# Patient Record
Sex: MALE | Race: WHITE | NOT HISPANIC OR LATINO | Employment: OTHER | URBAN - NONMETROPOLITAN AREA
[De-identification: names, ages, dates, MRNs, and addresses within clinical notes are randomized per-mention and may not be internally consistent; named-entity substitution may affect disease eponyms.]

---

## 2023-09-24 PROBLEM — G47.30 SLEEP APNEA: Status: ACTIVE | Noted: 2023-09-24

## 2023-09-24 PROBLEM — C61 PROSTATE CANCER (MULTI): Status: ACTIVE | Noted: 2023-09-24

## 2023-09-24 PROBLEM — E66.811 CLASS 1 OBESITY WITH BODY MASS INDEX (BMI) OF 31.0 TO 31.9 IN ADULT: Status: ACTIVE | Noted: 2023-09-24

## 2023-09-24 PROBLEM — I10 BENIGN ESSENTIAL HYPERTENSION: Status: ACTIVE | Noted: 2023-09-24

## 2023-09-24 PROBLEM — I71.21 ASCENDING AORTIC ANEURYSM (CMS-HCC): Status: ACTIVE | Noted: 2023-09-24

## 2023-09-24 PROBLEM — E66.9 CLASS 1 OBESITY WITH BODY MASS INDEX (BMI) OF 31.0 TO 31.9 IN ADULT: Status: ACTIVE | Noted: 2023-09-24

## 2023-09-24 PROBLEM — I34.0 MODERATE MITRAL REGURGITATION: Status: ACTIVE | Noted: 2023-09-24

## 2023-09-24 PROBLEM — R93.1 HIGH CORONARY ARTERY CALCIUM SCORE: Status: ACTIVE | Noted: 2023-09-24

## 2023-09-24 PROBLEM — I48.21 PERMANENT ATRIAL FIBRILLATION WITH RVR (MULTI): Status: ACTIVE | Noted: 2023-09-24

## 2023-09-24 PROBLEM — E78.5 HYPERLIPIDEMIA: Status: ACTIVE | Noted: 2023-09-24

## 2023-09-24 PROBLEM — I35.1 MILD AORTIC REGURGITATION: Status: ACTIVE | Noted: 2023-09-24

## 2023-09-24 RX ORDER — ALLOPURINOL 300 MG/1
1 TABLET ORAL DAILY
COMMUNITY

## 2023-09-24 RX ORDER — ROSUVASTATIN CALCIUM 20 MG/1
1 TABLET, COATED ORAL DAILY
COMMUNITY
End: 2024-03-07 | Stop reason: SDUPTHER

## 2023-09-24 RX ORDER — ASPIRIN 81 MG/1
81 TABLET ORAL 2 TIMES WEEKLY
COMMUNITY

## 2023-09-24 RX ORDER — DILTIAZEM HYDROCHLORIDE 180 MG/1
1 CAPSULE, EXTENDED RELEASE ORAL DAILY
COMMUNITY
End: 2023-11-02 | Stop reason: SDUPTHER

## 2023-09-24 RX ORDER — METOPROLOL SUCCINATE 25 MG/1
1 TABLET, EXTENDED RELEASE ORAL DAILY
COMMUNITY
End: 2024-05-16 | Stop reason: SDUPTHER

## 2023-09-24 RX ORDER — LEVOTHYROXINE SODIUM 137 UG/1
1 TABLET ORAL DAILY
COMMUNITY

## 2023-09-24 RX ORDER — LISINOPRIL 20 MG/1
1 TABLET ORAL DAILY
COMMUNITY

## 2023-10-23 ENCOUNTER — HOSPITAL ENCOUNTER (OUTPATIENT)
Dept: CARDIOLOGY | Facility: CLINIC | Age: 78
Discharge: HOME | End: 2023-10-23
Payer: MEDICARE

## 2023-10-23 VITALS
BODY MASS INDEX: 31.5 KG/M2 | HEIGHT: 70 IN | WEIGHT: 220 LBS | DIASTOLIC BLOOD PRESSURE: 70 MMHG | SYSTOLIC BLOOD PRESSURE: 118 MMHG

## 2023-10-23 DIAGNOSIS — I34.0 NONRHEUMATIC MITRAL (VALVE) INSUFFICIENCY: ICD-10-CM

## 2023-10-23 DIAGNOSIS — I10 ESSENTIAL (PRIMARY) HYPERTENSION: ICD-10-CM

## 2023-10-23 LAB — EJECTION FRACTION APICAL 4 CHAMBER: 53.4

## 2023-10-23 PROCEDURE — 93306 TTE W/DOPPLER COMPLETE: CPT | Performed by: INTERNAL MEDICINE

## 2023-10-23 PROCEDURE — 93306 TTE W/DOPPLER COMPLETE: CPT

## 2023-11-02 DIAGNOSIS — I10 BENIGN ESSENTIAL HYPERTENSION: ICD-10-CM

## 2023-11-02 RX ORDER — DILTIAZEM HYDROCHLORIDE 180 MG/1
180 CAPSULE, EXTENDED RELEASE ORAL DAILY
Qty: 90 CAPSULE | Refills: 3 | Status: SHIPPED | OUTPATIENT
Start: 2023-11-02

## 2023-11-03 ENCOUNTER — OFFICE VISIT (OUTPATIENT)
Dept: CARDIOLOGY | Facility: CLINIC | Age: 78
End: 2023-11-03
Payer: MEDICARE

## 2023-11-03 VITALS
BODY MASS INDEX: 31.98 KG/M2 | WEIGHT: 223.36 LBS | HEIGHT: 70 IN | DIASTOLIC BLOOD PRESSURE: 70 MMHG | HEART RATE: 71 BPM | SYSTOLIC BLOOD PRESSURE: 118 MMHG

## 2023-11-03 DIAGNOSIS — I71.21 ASCENDING AORTIC ANEURYSM, UNSPECIFIED WHETHER RUPTURED (CMS-HCC): ICD-10-CM

## 2023-11-03 DIAGNOSIS — I10 BENIGN ESSENTIAL HYPERTENSION: ICD-10-CM

## 2023-11-03 DIAGNOSIS — G47.30 SLEEP APNEA, UNSPECIFIED TYPE: ICD-10-CM

## 2023-11-03 DIAGNOSIS — I48.21 PERMANENT ATRIAL FIBRILLATION WITH RVR (MULTI): ICD-10-CM

## 2023-11-03 DIAGNOSIS — E78.5 HYPERLIPIDEMIA, UNSPECIFIED HYPERLIPIDEMIA TYPE: ICD-10-CM

## 2023-11-03 DIAGNOSIS — I35.1 MILD AORTIC REGURGITATION: ICD-10-CM

## 2023-11-03 DIAGNOSIS — I34.0 MODERATE MITRAL REGURGITATION: ICD-10-CM

## 2023-11-03 DIAGNOSIS — R93.1 HIGH CORONARY ARTERY CALCIUM SCORE: ICD-10-CM

## 2023-11-03 PROCEDURE — 1159F MED LIST DOCD IN RCRD: CPT | Performed by: INTERNAL MEDICINE

## 2023-11-03 PROCEDURE — 99214 OFFICE O/P EST MOD 30 MIN: CPT | Performed by: INTERNAL MEDICINE

## 2023-11-03 PROCEDURE — 3078F DIAST BP <80 MM HG: CPT | Performed by: INTERNAL MEDICINE

## 2023-11-03 PROCEDURE — 3074F SYST BP LT 130 MM HG: CPT | Performed by: INTERNAL MEDICINE

## 2023-11-03 PROCEDURE — 1036F TOBACCO NON-USER: CPT | Performed by: INTERNAL MEDICINE

## 2023-11-03 NOTE — PROGRESS NOTES
Subjective   Hardik Gama Jr. is a 78 y.o. male       Chief Complaint    Follow-up; Results          HPI   Patient is in the office for follow-up for the problems noted below.  He continues to be very active rides his bike 25 miles frequently he denies any palpitations or dyspnea.  Echocardiogram we did recently revealed normal ejection fraction with mild mitral and aortic regurgitation.  Review of system essentially normal physical examination was remarkable for irregular rhythm consistent with chronic atrial fibrillation and mild obesity.    Assessment/recommendations:     1-permanent atrial fibrillation on anticoagulation with Xarelto and rate control with Cardizem and metoprolol. This regimen has been well-tolerated without any problems and will be kept as is. Patient has left atrial dilatation based on his echoes  2-hypertension on medical therapy under control to be monitored lab data were ordered  3-hyperlipidemia on rosuvastatin, lipid profile has been monitored and under control  4-high-risk medication with anticoagulation, no bleeding complications  5-prostate cancer status post seeds implant, stable  6-class I obesity, encouraged patient to lose weight  7-severely elevated coronary calcium score with normal nuclear stress test 2021. Patient is currently on high intensity statin, advised patient take baby aspirin twice weekly. Patient has very active lifestyle.  8-mild mitral regurgitation.  Based on echocardiogram 2023.  Asymptomatic with no murmurs heard, recent echo findings were shared with the patient.  Next echocardiogram will be in 3 years  9-mild aortic regurgitation, inconsequential  10-mild dilatation of the ascending aorta just under 4 cm. We will follow by echocardiogram.  11-patient's father had coronary bypass surgery at later age. He lived to be 92 years old  12-sleep apnea but intolerant to CPAP machine  13-history of gout in remission on allopurinol with no recent flareups  Review of  "Systems   All other systems reviewed and are negative.         Visit Vitals  /70 (BP Location: Left arm, Patient Position: Sitting)   Pulse 71   Ht 1.778 m (5' 10\")   Wt 101 kg (223 lb 5.8 oz)   BMI 32.05 kg/m²   Smoking Status Never   BSA 2.23 m²        Objective   Physical Exam  Constitutional:       Appearance: Normal appearance. He is normal weight.   HENT:      Nose: Nose normal.   Neck:      Vascular: No carotid bruit.   Cardiovascular:      Rate and Rhythm: Normal rate. Rhythm irregular.      Pulses: Normal pulses.      Heart sounds: Normal heart sounds.   Pulmonary:      Effort: Pulmonary effort is normal.   Abdominal:      General: Bowel sounds are normal.      Palpations: Abdomen is soft.   Genitourinary:     Rectum: Normal.   Musculoskeletal:         General: Normal range of motion.      Cervical back: Normal range of motion.      Right lower leg: No edema.      Left lower leg: No edema.   Skin:     General: Skin is warm and dry.   Neurological:      General: No focal deficit present.      Mental Status: He is alert.   Psychiatric:         Mood and Affect: Mood normal.         Behavior: Behavior normal.         Thought Content: Thought content normal.         Judgment: Judgment normal.         Current Medications    Current Outpatient Medications:     allopurinol (Zyloprim) 300 mg tablet, Take 1 tablet (300 mg) by mouth once daily., Disp: , Rfl:     aspirin 81 mg EC tablet, Take 1 tablet (81 mg) by mouth 2 times a week., Disp: , Rfl:     dilTIAZem ER (Tiazac) 180 mg 24 hr capsule, Take 1 capsule (180 mg) by mouth once daily., Disp: 90 capsule, Rfl: 3    levothyroxine (Synthroid, Levoxyl) 137 mcg tablet, Take 1 tablet (137 mcg) by mouth once daily., Disp: , Rfl:     lisinopril 20 mg tablet, Take 1 tablet (20 mg) by mouth once daily., Disp: , Rfl:     metoprolol succinate XL (Toprol-XL) 25 mg 24 hr tablet, Take 1 tablet (25 mg) by mouth once daily., Disp: , Rfl:     rivaroxaban (Xarelto) 20 mg tablet, " Take 1 tablet (20 mg) by mouth once daily., Disp: , Rfl:     rosuvastatin (Crestor) 20 mg tablet, Take 1 tablet (20 mg) by mouth once daily., Disp: , Rfl:                      Assessment/Plan   1. Benign essential hypertension        2. Moderate mitral regurgitation        3. Mild aortic regurgitation        4. Permanent atrial fibrillation with RVR (CMS/HCC)        5. Ascending aortic aneurysm, unspecified whether ruptured (CMS/HCC)        6. High coronary artery calcium score        7. Hyperlipidemia, unspecified hyperlipidemia type        8. Sleep apnea, unspecified type

## 2023-12-11 ENCOUNTER — TELEPHONE (OUTPATIENT)
Dept: CARDIOLOGY | Facility: CLINIC | Age: 78
End: 2023-12-11
Payer: MEDICARE

## 2023-12-11 NOTE — TELEPHONE ENCOUNTER
Patient phoned and states that he has a blood shot eye and that it has been like this for about 4 days now. He is not sure what he did but denies any pain. He notes he is taking Xarelto daily and Aspirin 2 days weekly and was inquiring if he is able to hold them until this clears up.  I advised him to follow up with PCP/ optometrist. He verbalized understanding.     TO Cynthia Garcia NP to review in absence of Dr. Josefa Bui MD

## 2023-12-12 DIAGNOSIS — I48.21 PERMANENT ATRIAL FIBRILLATION WITH RVR (MULTI): ICD-10-CM

## 2023-12-12 RX ORDER — RIVAROXABAN 20 MG/1
20 TABLET, FILM COATED ORAL DAILY
Qty: 90 TABLET | Refills: 3 | Status: SHIPPED | OUTPATIENT
Start: 2023-12-12

## 2023-12-12 NOTE — TELEPHONE ENCOUNTER
Patient contacted. Verbalized understanding to plan.  Will keep office updated if eye does not improve with recommendations.

## 2024-03-06 DIAGNOSIS — E78.2 MIXED HYPERLIPIDEMIA: ICD-10-CM

## 2024-03-07 RX ORDER — ROSUVASTATIN CALCIUM 20 MG/1
20 TABLET, COATED ORAL DAILY
Qty: 90 TABLET | Refills: 3 | Status: SHIPPED | OUTPATIENT
Start: 2024-03-07

## 2024-05-14 LAB
NON-UH HIE ANION GAP:SCNC:PT:SER/PLAS:QN:: 12 MEQ/L (ref 6–16)
NON-UH HIE CALCIUM:MCNC:PT:SER/PLAS:QN:: 9.2 MG/DL (ref 8.9–11.1)
NON-UH HIE CARBON DIOXIDE:SCNC:PT:SER/PLAS:QN:: 24 MMOL/L (ref 21–31)
NON-UH HIE CHLORIDE:SCNC:PT:SER/PLAS:QN:: 107 MMOL/L (ref 101–111)
NON-UH HIE CREATININE:MCNC:PT:SER/PLAS:QN:: 1 MG/DL (ref 0.5–1.3)
NON-UH HIE EGFR: 77 ML/MIN/1.73 M2
NON-UH HIE ERYTHROCYTE DISTRIBUTION WIDTH:RATIO:PT:RBC:QN:AUTOMATED COUNT: 14 % (ref 10.9–14.2)
NON-UH HIE ERYTHROCYTE MEAN CORPUSCULAR HEMOGLOBIN CONCENTRATION:MCNC:PT:RB: 34 GM/DL (ref 31.4–36)
NON-UH HIE ERYTHROCYTE MEAN CORPUSCULAR HEMOGLOBIN:ENTMASS:PT:RBC:QN:AUTOMA: 30.2 PG (ref 27–34)
NON-UH HIE ERYTHROCYTE MEAN CORPUSCULAR VOLUME:ENTVOL:PT:RBC:QN:AUTOMATED C: 88.8 FL (ref 80–100)
NON-UH HIE ERYTHROCYTE SIZE:MORPH:PT:BLD:NOM:: NORMAL
NON-UH HIE ERYTHROCYTES:NCNC:PT:BLD:QN:AUTOMATED COUNT: 4.7 E12/L (ref 4.3–5.9)
NON-UH HIE GLUCOSE:MCNC:PT:SER/PLAS:QN:: 113 MG/DL (ref 55–199)
NON-UH HIE HEMATOCRIT:VFR:PT:BLD:QN:AUTOMATED COUNT: 41.6 % (ref 37.7–49)
NON-UH HIE HEMOGLOBIN:MCNC:PT:BLD:QN:: 14.1 GM/DL (ref 13.5–17.5)
NON-UH HIE LEUKOCYTES: 6 E9/L (ref 4–11)
NON-UH HIE PLATELET MEAN VOLUME:ENTVOL:PT:BLD:QN:AUTOMATED COUNT: 7.6 FL (ref 6.4–10.8)
NON-UH HIE PLATELETS:NCNC:PT:BLD:QN:AUTOMATED COUNT: 280 E9/L (ref 150–500)
NON-UH HIE POTASSIUM:SCNC:PT:SER/PLAS:QN:: 4.6 MMOL/L (ref 3.5–5.3)
NON-UH HIE SODIUM:SCNC:PT:SER/PLAS:QN:: 138 MMOL/L (ref 135–145)
NON-UH HIE UREA NITROGEN/CREATININE:MRTO:PT:SER/PLAS:QN:: 24 NO UNITS (ref 10–20)
NON-UH HIE UREA NITROGEN:MCNC:PT:SER/PLAS:QN:: 24 MG/DL (ref 5–21)

## 2024-05-16 DIAGNOSIS — I10 BENIGN ESSENTIAL HYPERTENSION: ICD-10-CM

## 2024-05-17 ENCOUNTER — OFFICE VISIT (OUTPATIENT)
Dept: CARDIOLOGY | Facility: CLINIC | Age: 79
End: 2024-05-17
Payer: MEDICARE

## 2024-05-17 VITALS
WEIGHT: 216 LBS | DIASTOLIC BLOOD PRESSURE: 84 MMHG | HEART RATE: 78 BPM | HEIGHT: 70 IN | SYSTOLIC BLOOD PRESSURE: 120 MMHG | BODY MASS INDEX: 30.92 KG/M2

## 2024-05-17 DIAGNOSIS — I10 BENIGN ESSENTIAL HYPERTENSION: ICD-10-CM

## 2024-05-17 DIAGNOSIS — E78.5 HYPERLIPIDEMIA, UNSPECIFIED HYPERLIPIDEMIA TYPE: ICD-10-CM

## 2024-05-17 DIAGNOSIS — I71.21 ANEURYSM OF ASCENDING AORTA WITHOUT RUPTURE (CMS-HCC): ICD-10-CM

## 2024-05-17 DIAGNOSIS — R93.1 HIGH CORONARY ARTERY CALCIUM SCORE: ICD-10-CM

## 2024-05-17 DIAGNOSIS — I34.0 MILD MITRAL REGURGITATION: Primary | ICD-10-CM

## 2024-05-17 DIAGNOSIS — E66.9 OBESITY (BMI 30.0-34.9): ICD-10-CM

## 2024-05-17 DIAGNOSIS — Z79.899 HIGH RISK MEDICATION USE: ICD-10-CM

## 2024-05-17 DIAGNOSIS — I35.1 MILD AORTIC REGURGITATION: ICD-10-CM

## 2024-05-17 DIAGNOSIS — I48.21 PERMANENT ATRIAL FIBRILLATION WITH RVR (MULTI): ICD-10-CM

## 2024-05-17 DIAGNOSIS — Z87.39 HISTORY OF GOUT: ICD-10-CM

## 2024-05-17 DIAGNOSIS — Z78.9 NEVER SMOKED TOBACCO: ICD-10-CM

## 2024-05-17 PROCEDURE — 3079F DIAST BP 80-89 MM HG: CPT | Performed by: INTERNAL MEDICINE

## 2024-05-17 PROCEDURE — 1036F TOBACCO NON-USER: CPT | Performed by: INTERNAL MEDICINE

## 2024-05-17 PROCEDURE — 99214 OFFICE O/P EST MOD 30 MIN: CPT | Performed by: INTERNAL MEDICINE

## 2024-05-17 PROCEDURE — 1159F MED LIST DOCD IN RCRD: CPT | Performed by: INTERNAL MEDICINE

## 2024-05-17 PROCEDURE — 3074F SYST BP LT 130 MM HG: CPT | Performed by: INTERNAL MEDICINE

## 2024-05-17 NOTE — PATIENT INSTRUCTIONS
Please bring all medicines, vitamins, and herbal supplements with you when you come to the office.    Prescriptions will not be filled unless you are compliant with your follow up appointments or have a follow up appointment scheduled as per instruction of your physician. Refills should be requested at the time of your visit.     BMI was above normal measurement. Current weight: 98 kg (216 lb)  Weight change since last visit (-) denotes wt loss -7.36 lbs   Weight loss needed to achieve BMI 25: 42.1 Lbs  Weight loss needed to achieve BMI 30: 7.4 Lbs  Provided instructions on dietary changes.      6 months with lab work  Same meds

## 2024-05-17 NOTE — LETTER
May 17, 2024     Sharlene Polk MD  [address] [place] [id number]    Patient: Hardik Gama Jr.   YOB: 1945   Date of Visit: 5/17/2024       Dear Dr. Sharlene Polk MD:    Thank you for referring Hardik Gama to me for evaluation. Below are my notes for this consultation.  If you have questions, please do not hesitate to call me. I look forward to following your patient along with you.       Sincerely,     Josefa Bui MD      CC: No Recipients  ______________________________________________________________________________________    Subjective   Hardik Gama Jr. is a 78 y.o. male       Chief Complaint    Follow-up          HPI   Patient is in the office for 6-month follow-up for permanent atrial fibrillation among other problems noted below.  He remains very active riding his bike regularly.  His weight is coming down nicely.  He has no palpitations dyspnea or chest pain.  He had recent lab data which I reviewed with him and there was no concern.  He has been on Xarelto without any bleeding complications.  His examination is only remarkable for irregular rhythm and class I obesity.  No cardiac murmurs were heard.  I reviewed with him the last echocardiogram we did back in September 2023.    Assessment/recommendations:     1-permanent atrial fibrillation on anticoagulation with Xarelto and rate control with Cardizem and metoprolol. This regimen has been well-tolerated without any problems and will be kept as is.   2-hypertension on medical therapy under control on metoprolol and diltiazem to be monitored, renal function is normal.  3-hyperlipidemia on rosuvastatin 20 mg daily, lipid profile has been monitored and under control  4-high-risk medication with anticoagulation, no bleeding complications  5-prostate cancer status post seeds implant, stable  6-class I obesity, patient's weight has been coming down and he plans on more weight loss in the next few months  7-severely elevated  "coronary calcium score with normal nuclear stress test 2021. Patient is currently on high intensity statin, advised patient take baby aspirin twice weekly. Patient has very active lifestyle.  8-mild mitral regurgitation.  Based on echocardiogram 2023.  Asymptomatic with no murmurs heard, last echo findings were shared with the patient.  Next echocardiogram will be in 3 years  9-mild aortic regurgitation, inconsequential  10-mild dilatation of the ascending aorta just under 4 cm. We will follow by echocardiogram.  11-patient's father had coronary bypass surgery at later age. He lived to be 92 years old  12-sleep apnea but intolerant to CPAP machine  13-history of gout in remission on allopurinol with no recent flareups  Review of Systems   All other systems reviewed and are negative.           Vitals:    05/17/24 0858   BP: 120/84   Pulse: 78   Weight: 98 kg (216 lb)   Height: 1.778 m (5' 10\")        Objective   Physical Exam  Constitutional:       Appearance: Normal appearance.   HENT:      Nose: Nose normal.   Neck:      Vascular: No carotid bruit.   Cardiovascular:      Rate and Rhythm: Normal rate. Rhythm irregularly irregular.      Pulses: Normal pulses.      Heart sounds: Normal heart sounds.   Pulmonary:      Effort: Pulmonary effort is normal.   Abdominal:      General: Bowel sounds are normal.      Palpations: Abdomen is soft.   Musculoskeletal:         General: Normal range of motion.      Cervical back: Normal range of motion.      Right lower leg: No edema.      Left lower leg: No edema.   Skin:     General: Skin is warm and dry.   Neurological:      General: No focal deficit present.      Mental Status: He is alert.   Psychiatric:         Mood and Affect: Mood normal.         Behavior: Behavior normal.         Thought Content: Thought content normal.         Judgment: Judgment normal.         Allergies  Patient has no known allergies.     Current Medications    Current Outpatient Medications:   •  " allopurinol (Zyloprim) 300 mg tablet, Take 1 tablet (300 mg) by mouth once daily., Disp: , Rfl:   •  aspirin 81 mg EC tablet, Take 1 tablet (81 mg) by mouth 2 times a week., Disp: , Rfl:   •  dilTIAZem ER (Tiazac) 180 mg 24 hr capsule, Take 1 capsule (180 mg) by mouth once daily., Disp: 90 capsule, Rfl: 3  •  levothyroxine (Synthroid, Levoxyl) 137 mcg tablet, Take 1 tablet (137 mcg) by mouth once daily., Disp: , Rfl:   •  lisinopril 20 mg tablet, Take 1 tablet (20 mg) by mouth once daily., Disp: , Rfl:   •  metoprolol succinate XL (Toprol-XL) 25 mg 24 hr tablet, Take 1 tablet (25 mg) by mouth once daily., Disp: , Rfl:   •  rosuvastatin (Crestor) 20 mg tablet, TAKE 1 TABLET BY MOUTH DAILY, Disp: 90 tablet, Rfl: 3  •  Xarelto 20 mg tablet, TAKE 1 TABLET BY MOUTH DAILY, Disp: 90 tablet, Rfl: 3                     Assessment/Plan   1. Mild mitral regurgitation        2. Permanent atrial fibrillation with RVR (Multi)  Follow Up In Cardiology    Follow Up In Cardiology    Basic Metabolic Panel    CBC      3. Benign essential hypertension  Follow Up In Cardiology    Basic Metabolic Panel      4. Mild aortic regurgitation        5. Hyperlipidemia, unspecified hyperlipidemia type  Alanine Aminotransferase    Lipid Panel    Aspartate Aminotransferase      6. High coronary artery calcium score  Alanine Aminotransferase    Lipid Panel    Aspartate Aminotransferase      7. Aneurysm of ascending aorta without rupture (CMS-HCC)        8. High risk medication use  CBC      9. Obesity (BMI 30.0-34.9)        10. Never smoked tobacco        11. History of gout                 Scribe Attestation  By signing my name below, Susanne PRESTON LPN, Scribe   attest that this documentation has been prepared under the direction and in the presence of Josefa Bui MD.     Provider Attestation - Scribe documentation    All medical record entries made by the Scribe were at my direction and personally dictated by me. I have reviewed the chart  and agree that the record accurately reflects my personal performance of the history, physical exam, discussion and plan.

## 2024-05-17 NOTE — PROGRESS NOTES
Subjective   Hardik Gama Jr. is a 78 y.o. male       Chief Complaint    Follow-up          HPI   Patient is in the office for 6-month follow-up for permanent atrial fibrillation among other problems noted below.  He remains very active riding his bike regularly.  His weight is coming down nicely.  He has no palpitations dyspnea or chest pain.  He had recent lab data which I reviewed with him and there was no concern.  He has been on Xarelto without any bleeding complications.  His examination is only remarkable for irregular rhythm and class I obesity.  No cardiac murmurs were heard.  I reviewed with him the last echocardiogram we did back in September 2023.    Assessment/recommendations:     1-permanent atrial fibrillation on anticoagulation with Xarelto and rate control with Cardizem and metoprolol. This regimen has been well-tolerated without any problems and will be kept as is.   2-hypertension on medical therapy under control on metoprolol and diltiazem to be monitored, renal function is normal.  3-hyperlipidemia on rosuvastatin 20 mg daily, lipid profile has been monitored and under control  4-high-risk medication with anticoagulation, no bleeding complications  5-prostate cancer status post seeds implant, stable  6-class I obesity, patient's weight has been coming down and he plans on more weight loss in the next few months  7-severely elevated coronary calcium score with normal nuclear stress test 2021. Patient is currently on high intensity statin, advised patient take baby aspirin twice weekly. Patient has very active lifestyle.  8-mild mitral regurgitation.  Based on echocardiogram 2023.  Asymptomatic with no murmurs heard, last echo findings were shared with the patient.  Next echocardiogram will be in 3 years  9-mild aortic regurgitation, inconsequential  10-mild dilatation of the ascending aorta just under 4 cm. We will follow by echocardiogram.  11-patient's father had coronary bypass surgery at  "later age. He lived to be 92 years old  12-sleep apnea but intolerant to CPAP machine  13-history of gout in remission on allopurinol with no recent flareups  Review of Systems   All other systems reviewed and are negative.           Vitals:    05/17/24 0858   BP: 120/84   Pulse: 78   Weight: 98 kg (216 lb)   Height: 1.778 m (5' 10\")        Objective   Physical Exam  Constitutional:       Appearance: Normal appearance.   HENT:      Nose: Nose normal.   Neck:      Vascular: No carotid bruit.   Cardiovascular:      Rate and Rhythm: Normal rate. Rhythm irregularly irregular.      Pulses: Normal pulses.      Heart sounds: Normal heart sounds.   Pulmonary:      Effort: Pulmonary effort is normal.   Abdominal:      General: Bowel sounds are normal.      Palpations: Abdomen is soft.   Musculoskeletal:         General: Normal range of motion.      Cervical back: Normal range of motion.      Right lower leg: No edema.      Left lower leg: No edema.   Skin:     General: Skin is warm and dry.   Neurological:      General: No focal deficit present.      Mental Status: He is alert.   Psychiatric:         Mood and Affect: Mood normal.         Behavior: Behavior normal.         Thought Content: Thought content normal.         Judgment: Judgment normal.         Allergies  Patient has no known allergies.     Current Medications    Current Outpatient Medications:     allopurinol (Zyloprim) 300 mg tablet, Take 1 tablet (300 mg) by mouth once daily., Disp: , Rfl:     aspirin 81 mg EC tablet, Take 1 tablet (81 mg) by mouth 2 times a week., Disp: , Rfl:     dilTIAZem ER (Tiazac) 180 mg 24 hr capsule, Take 1 capsule (180 mg) by mouth once daily., Disp: 90 capsule, Rfl: 3    levothyroxine (Synthroid, Levoxyl) 137 mcg tablet, Take 1 tablet (137 mcg) by mouth once daily., Disp: , Rfl:     lisinopril 20 mg tablet, Take 1 tablet (20 mg) by mouth once daily., Disp: , Rfl:     metoprolol succinate XL (Toprol-XL) 25 mg 24 hr tablet, Take 1 tablet " (25 mg) by mouth once daily., Disp: , Rfl:     rosuvastatin (Crestor) 20 mg tablet, TAKE 1 TABLET BY MOUTH DAILY, Disp: 90 tablet, Rfl: 3    Xarelto 20 mg tablet, TAKE 1 TABLET BY MOUTH DAILY, Disp: 90 tablet, Rfl: 3                     Assessment/Plan   1. Mild mitral regurgitation        2. Permanent atrial fibrillation with RVR (Multi)  Follow Up In Cardiology    Follow Up In Cardiology    Basic Metabolic Panel    CBC      3. Benign essential hypertension  Follow Up In Cardiology    Basic Metabolic Panel      4. Mild aortic regurgitation        5. Hyperlipidemia, unspecified hyperlipidemia type  Alanine Aminotransferase    Lipid Panel    Aspartate Aminotransferase      6. High coronary artery calcium score  Alanine Aminotransferase    Lipid Panel    Aspartate Aminotransferase      7. Aneurysm of ascending aorta without rupture (CMS-HCC)        8. High risk medication use  CBC      9. Obesity (BMI 30.0-34.9)        10. Never smoked tobacco        11. History of gout                 Scribe Attestation  By signing my name below, Susanne PRESTON LPN, Scribe   attest that this documentation has been prepared under the direction and in the presence of Josefa Bui MD.     Provider Attestation - Scribe documentation    All medical record entries made by the Scribe were at my direction and personally dictated by me. I have reviewed the chart and agree that the record accurately reflects my personal performance of the history, physical exam, discussion and plan.

## 2024-05-19 RX ORDER — METOPROLOL SUCCINATE 25 MG/1
25 TABLET, EXTENDED RELEASE ORAL DAILY
Qty: 90 TABLET | Refills: 3 | Status: SHIPPED | OUTPATIENT
Start: 2024-05-19 | End: 2025-05-19

## 2024-08-06 DIAGNOSIS — I10 BENIGN ESSENTIAL HYPERTENSION: ICD-10-CM

## 2024-08-06 RX ORDER — DILTIAZEM HYDROCHLORIDE 180 MG/1
180 CAPSULE, EXTENDED RELEASE ORAL DAILY
Qty: 90 CAPSULE | Refills: 3 | Status: SHIPPED | OUTPATIENT
Start: 2024-08-06 | End: 2025-08-06

## 2024-09-30 LAB
NON-UH HIE ALANINE AMINOTRANSFERASE:CCNC:PT:SER/PLAS:QN:NO ADDITION OF P-5': 17 INT._UNIT/L (ref 6–46)
NON-UH HIE ANION GAP:SCNC:PT:SER/PLAS:QN:: 11 MEQ/L (ref 6–16)
NON-UH HIE ASPARTATE AMINOTRANSFERASE:CCNC:PT:SER/PLAS:QN:: 20 INT._UNIT/L (ref 5–43)
NON-UH HIE CALCIUM:MCNC:PT:SER/PLAS:QN:: 10.1 MG/DL (ref 8.9–11.1)
NON-UH HIE CARBON DIOXIDE:SCNC:PT:SER/PLAS:QN:: 27 MMOL/L (ref 21–31)
NON-UH HIE CHLORIDE:SCNC:PT:SER/PLAS:QN:: 103 MMOL/L (ref 101–111)
NON-UH HIE CHOLESTEROL.IN HDL:MCNC:PT:SER/PLAS:QN:: 53 MG/DL
NON-UH HIE CHOLESTEROL.IN LDL:MCNC:PT:SER/PLAS:QN:: 62 MG/DL
NON-UH HIE CHOLESTEROL.IN VLDL:MCNC:PT:SER/PLAS:QN:CALCULATED: 15 MG/DL (ref 7–40)
NON-UH HIE CHOLESTEROL:MCNC:PT:SER/PLAS:QN:: 123 MG/DL (ref 120–200)
NON-UH HIE CREATININE:MCNC:PT:SER/PLAS:QN:: 1 MG/DL (ref 0.5–1.3)
NON-UH HIE EGFR: 77 ML/MIN/1.73 M2
NON-UH HIE ERYTHROCYTE DISTRIBUTION WIDTH:RATIO:PT:RBC:QN:AUTOMATED COUNT: 14.7 % (ref 10.9–14.2)
NON-UH HIE ERYTHROCYTE MEAN CORPUSCULAR HEMOGLOBIN CONCENTRATION:MCNC:PT:RB: 34.9 GM/DL (ref 31.4–36)
NON-UH HIE ERYTHROCYTE MEAN CORPUSCULAR HEMOGLOBIN:ENTMASS:PT:RBC:QN:AUTOMA: 29.4 PG (ref 27–34)
NON-UH HIE ERYTHROCYTE MEAN CORPUSCULAR VOLUME:ENTVOL:PT:RBC:QN:AUTOMATED C: 84.1 FL (ref 80–100)
NON-UH HIE ERYTHROCYTE SIZE:MORPH:PT:BLD:NOM:: NORMAL
NON-UH HIE ERYTHROCYTES:NCNC:PT:BLD:QN:AUTOMATED COUNT: 4.7 E12/L (ref 4.3–5.9)
NON-UH HIE GLUCOSE:MCNC:PT:SER/PLAS:QN:: 87 MG/DL (ref 55–199)
NON-UH HIE HEMATOCRIT:VFR:PT:BLD:QN:AUTOMATED COUNT: 39.3 % (ref 37.7–49)
NON-UH HIE HEMOGLOBIN:MCNC:PT:BLD:QN:: 13.7 GM/DL (ref 13.5–17.5)
NON-UH HIE LEUKOCYTES: 6.7 E9/L (ref 4–11)
NON-UH HIE PLATELET MEAN VOLUME:ENTVOL:PT:BLD:QN:AUTOMATED COUNT: 6.6 FL (ref 6.4–10.8)
NON-UH HIE PLATELET: 320 E9/L (ref 150–500)
NON-UH HIE POTASSIUM:SCNC:PT:SER/PLAS:QN:: 4.4 MMOL/L (ref 3.5–5.3)
NON-UH HIE SODIUM:SCNC:PT:SER/PLAS:QN:: 137 MMOL/L (ref 135–145)
NON-UH HIE TRIGLYCERIDE:MCNC:PT:SER/PLAS:QN:: 77 MG/DL
NON-UH HIE UREA NITROGEN/CREATININE:MRTO:PT:SER/PLAS:QN:: 18 NO UNITS (ref 10–20)
NON-UH HIE UREA NITROGEN:MCNC:PT:SER/PLAS:QN:: 18 MG/DL (ref 5–21)

## 2024-10-04 ENCOUNTER — APPOINTMENT (OUTPATIENT)
Dept: CARDIOLOGY | Facility: CLINIC | Age: 79
End: 2024-10-04
Payer: MEDICARE

## 2024-10-28 ENCOUNTER — APPOINTMENT (OUTPATIENT)
Dept: CARDIOLOGY | Facility: CLINIC | Age: 79
End: 2024-10-28
Payer: MEDICARE

## 2024-11-12 ENCOUNTER — APPOINTMENT (OUTPATIENT)
Dept: CARDIOLOGY | Facility: CLINIC | Age: 79
End: 2024-11-12
Payer: MEDICARE

## 2024-11-12 VITALS
SYSTOLIC BLOOD PRESSURE: 118 MMHG | DIASTOLIC BLOOD PRESSURE: 78 MMHG | HEART RATE: 76 BPM | HEIGHT: 70 IN | WEIGHT: 209 LBS | BODY MASS INDEX: 29.92 KG/M2

## 2024-11-12 DIAGNOSIS — I34.0 MILD MITRAL REGURGITATION: ICD-10-CM

## 2024-11-12 DIAGNOSIS — I10 BENIGN ESSENTIAL HYPERTENSION: ICD-10-CM

## 2024-11-12 DIAGNOSIS — E78.2 MIXED HYPERLIPIDEMIA: ICD-10-CM

## 2024-11-12 DIAGNOSIS — I48.21 PERMANENT ATRIAL FIBRILLATION WITH RVR (MULTI): Primary | ICD-10-CM

## 2024-11-12 DIAGNOSIS — Z79.899 HIGH RISK MEDICATION USE: ICD-10-CM

## 2024-11-12 DIAGNOSIS — I71.21 ANEURYSM OF ASCENDING AORTA WITHOUT RUPTURE (CMS-HCC): ICD-10-CM

## 2024-11-12 DIAGNOSIS — G47.33 OBSTRUCTIVE SLEEP APNEA SYNDROME: ICD-10-CM

## 2024-11-12 DIAGNOSIS — I35.1 MILD AORTIC REGURGITATION: ICD-10-CM

## 2024-11-12 PROBLEM — E66.811 OBESITY (BMI 30.0-34.9): Status: RESOLVED | Noted: 2023-09-24 | Resolved: 2024-11-12

## 2024-11-12 PROCEDURE — 99214 OFFICE O/P EST MOD 30 MIN: CPT | Performed by: INTERNAL MEDICINE

## 2024-11-12 PROCEDURE — 1036F TOBACCO NON-USER: CPT | Performed by: INTERNAL MEDICINE

## 2024-11-12 PROCEDURE — 1159F MED LIST DOCD IN RCRD: CPT | Performed by: INTERNAL MEDICINE

## 2024-11-12 PROCEDURE — 3074F SYST BP LT 130 MM HG: CPT | Performed by: INTERNAL MEDICINE

## 2024-11-12 PROCEDURE — 3078F DIAST BP <80 MM HG: CPT | Performed by: INTERNAL MEDICINE

## 2024-11-12 RX ORDER — BICALUTAMIDE 50 MG/1
50 TABLET, FILM COATED ORAL
COMMUNITY
Start: 2024-07-05

## 2024-11-12 RX ORDER — DILTIAZEM HYDROCHLORIDE 180 MG/1
180 CAPSULE, EXTENDED RELEASE ORAL DAILY
Qty: 90 CAPSULE | Refills: 3 | Status: SHIPPED | OUTPATIENT
Start: 2024-11-12 | End: 2024-11-14 | Stop reason: SDUPTHER
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
## 2024-11-12 NOTE — PATIENT INSTRUCTIONS
Please bring all medicines, vitamins, and herbal supplements with you when you come to the office.    Prescriptions will not be filled unless you are compliant with your follow up appointments or have a follow up appointment scheduled as per instruction of your physician. Refills should be requested at the time of your visit.     BMI was above normal measurement. Current weight: 94.8 kg (209 lb)  Weight change since last visit (-) denotes wt loss -7 lbs   Weight loss needed to achieve BMI 25: 35.1 Lbs  Weight loss needed to achieve BMI 30: 0.4 Lbs  Provided instructions on dietary changes  Provided instructions on exercise.
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
## 2024-11-14 DIAGNOSIS — I10 BENIGN ESSENTIAL HYPERTENSION: ICD-10-CM

## 2024-11-15 RX ORDER — DILTIAZEM HYDROCHLORIDE 180 MG/1
180 CAPSULE, EXTENDED RELEASE ORAL DAILY
Qty: 90 CAPSULE | Refills: 3 | Status: SHIPPED | OUTPATIENT
Start: 2024-11-15 | End: 2025-11-15

## 2024-11-15 NOTE — TELEPHONE ENCOUNTER
Pt phones inquiring on medication being sent to [facility] [place]. Informed him that script will be signed by physician.

## 2024-12-12 DIAGNOSIS — I48.21 PERMANENT ATRIAL FIBRILLATION WITH RVR (MULTI): ICD-10-CM

## 2025-02-12 DIAGNOSIS — I10 BENIGN ESSENTIAL HYPERTENSION: ICD-10-CM

## 2025-02-13 RX ORDER — METOPROLOL SUCCINATE 25 MG/1
25 TABLET, EXTENDED RELEASE ORAL DAILY
Qty: 90 TABLET | Refills: 3 | Status: SHIPPED | OUTPATIENT
Start: 2025-02-13 | End: 2026-02-13

## 2025-05-13 DIAGNOSIS — I10 BENIGN ESSENTIAL HYPERTENSION: ICD-10-CM

## 2025-05-13 RX ORDER — METOPROLOL SUCCINATE 25 MG/1
25 TABLET, EXTENDED RELEASE ORAL DAILY
Qty: 90 TABLET | Refills: 3 | Status: SHIPPED | OUTPATIENT
Start: 2025-05-13 | End: 2026-05-13

## 2025-05-27 DIAGNOSIS — E78.2 MIXED HYPERLIPIDEMIA: ICD-10-CM

## 2025-05-27 RX ORDER — ROSUVASTATIN CALCIUM 20 MG/1
20 TABLET, COATED ORAL DAILY
Qty: 90 TABLET | Refills: 3 | Status: SHIPPED | OUTPATIENT
Start: 2025-05-27

## 2025-06-25 ENCOUNTER — APPOINTMENT (OUTPATIENT)
Dept: CARDIOLOGY | Facility: CLINIC | Age: 80
End: 2025-06-25
Payer: MEDICARE

## 2025-06-25 VITALS
DIASTOLIC BLOOD PRESSURE: 80 MMHG | HEART RATE: 72 BPM | WEIGHT: 213 LBS | BODY MASS INDEX: 30.49 KG/M2 | SYSTOLIC BLOOD PRESSURE: 116 MMHG | HEIGHT: 70 IN

## 2025-06-25 DIAGNOSIS — Z87.39 HISTORY OF GOUT: ICD-10-CM

## 2025-06-25 DIAGNOSIS — I71.21 ANEURYSM OF ASCENDING AORTA WITHOUT RUPTURE: ICD-10-CM

## 2025-06-25 DIAGNOSIS — Z78.9 NEVER SMOKED TOBACCO: ICD-10-CM

## 2025-06-25 DIAGNOSIS — G47.33 OBSTRUCTIVE SLEEP APNEA SYNDROME: ICD-10-CM

## 2025-06-25 DIAGNOSIS — E78.2 MIXED HYPERLIPIDEMIA: ICD-10-CM

## 2025-06-25 DIAGNOSIS — I10 ESSENTIAL HYPERTENSION: ICD-10-CM

## 2025-06-25 DIAGNOSIS — I35.1 MILD AORTIC REGURGITATION: ICD-10-CM

## 2025-06-25 DIAGNOSIS — I48.21 PERMANENT ATRIAL FIBRILLATION WITH RVR (MULTI): Primary | ICD-10-CM

## 2025-06-25 DIAGNOSIS — Z79.899 HIGH RISK MEDICATION USE: ICD-10-CM

## 2025-06-25 DIAGNOSIS — I34.0 MILD MITRAL REGURGITATION: ICD-10-CM

## 2025-06-25 PROCEDURE — G2211 COMPLEX E/M VISIT ADD ON: HCPCS | Performed by: INTERNAL MEDICINE

## 2025-06-25 PROCEDURE — 3079F DIAST BP 80-89 MM HG: CPT | Performed by: INTERNAL MEDICINE

## 2025-06-25 PROCEDURE — 3074F SYST BP LT 130 MM HG: CPT | Performed by: INTERNAL MEDICINE

## 2025-06-25 PROCEDURE — 1036F TOBACCO NON-USER: CPT | Performed by: INTERNAL MEDICINE

## 2025-06-25 PROCEDURE — 1159F MED LIST DOCD IN RCRD: CPT | Performed by: INTERNAL MEDICINE

## 2025-06-25 PROCEDURE — 99214 OFFICE O/P EST MOD 30 MIN: CPT | Performed by: INTERNAL MEDICINE
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
## 2025-08-13 DIAGNOSIS — I10 BENIGN ESSENTIAL HYPERTENSION: ICD-10-CM

## 2025-08-13 RX ORDER — HYDROCODONE BITARTRATE AND ACETAMINOPHEN 5; 325 MG/1; MG/1
TABLET ORAL
COMMUNITY
Start: 2025-08-02

## 2025-08-14 RX ORDER — DILTIAZEM HYDROCHLORIDE 180 MG/1
180 CAPSULE, EXTENDED RELEASE ORAL DAILY
Qty: 90 CAPSULE | Refills: 3 | Status: SHIPPED | OUTPATIENT
Start: 2025-08-14

## 2026-01-28 ENCOUNTER — APPOINTMENT (OUTPATIENT)
Dept: CARDIOLOGY | Facility: CLINIC | Age: 81
End: 2026-01-28
Payer: MEDICARE